# Patient Record
Sex: FEMALE | Race: WHITE | NOT HISPANIC OR LATINO | Employment: FULL TIME | URBAN - METROPOLITAN AREA
[De-identification: names, ages, dates, MRNs, and addresses within clinical notes are randomized per-mention and may not be internally consistent; named-entity substitution may affect disease eponyms.]

---

## 2018-04-09 ENCOUNTER — HISTORICAL (OUTPATIENT)
Dept: ADMINISTRATIVE | Facility: HOSPITAL | Age: 28
End: 2018-04-09

## 2018-04-09 LAB — PRODUCT READY: NORMAL

## 2018-05-27 ENCOUNTER — HOSPITAL ENCOUNTER (OUTPATIENT)
Dept: OBSTETRICS AND GYNECOLOGY | Facility: HOSPITAL | Age: 28
End: 2018-05-28
Attending: OBSTETRICS & GYNECOLOGY | Admitting: OBSTETRICS & GYNECOLOGY

## 2018-05-30 LAB — FINAL CULTURE: NORMAL

## 2022-03-10 ENCOUNTER — HISTORICAL (OUTPATIENT)
Dept: ADMINISTRATIVE | Facility: HOSPITAL | Age: 32
End: 2022-03-10

## 2022-03-10 ENCOUNTER — HISTORICAL (OUTPATIENT)
Dept: PREADMISSION TESTING | Facility: HOSPITAL | Age: 32
End: 2022-03-10

## 2022-03-10 LAB
ABS NEUT (OLG): 4.9 (ref 2.1–9.2)
ALBUMIN SERPL-MCNC: 4.4 G/DL (ref 3.5–5)
ALBUMIN/GLOB SERPL: 1.1 {RATIO} (ref 1.1–2)
ALP SERPL-CCNC: 84 U/L (ref 40–150)
ALT SERPL-CCNC: 20 U/L (ref 0–55)
APTT PPP: 34.8 S (ref 23.2–33.7)
AST SERPL-CCNC: 17 U/L (ref 5–34)
BASOPHILS # BLD AUTO: 0 10*3/UL (ref 0–0.2)
BASOPHILS NFR BLD AUTO: 0 %
BILIRUB SERPL-MCNC: 0.4 MG/DL
BILIRUBIN DIRECT+TOT PNL SERPL-MCNC: 0.2 (ref 0–0.5)
BILIRUBIN DIRECT+TOT PNL SERPL-MCNC: 0.2 (ref 0–0.8)
BUN SERPL-MCNC: 10.5 MG/DL (ref 7–18.7)
CALCIUM SERPL-MCNC: 9.3 MG/DL (ref 8.7–10.5)
CHLORIDE SERPL-SCNC: 105 MMOL/L (ref 98–107)
CO2 SERPL-SCNC: 26 MMOL/L (ref 22–29)
CREAT SERPL-MCNC: 0.73 MG/DL (ref 0.55–1.02)
EOSINOPHIL # BLD AUTO: 0 10*3/UL (ref 0–0.9)
EOSINOPHIL NFR BLD AUTO: 0 %
ERYTHROCYTE [DISTWIDTH] IN BLOOD BY AUTOMATED COUNT: 12.8 % (ref 11.5–17)
GLOBULIN SER-MCNC: 4.1 G/DL (ref 2.4–3.5)
GLUCOSE SERPL-MCNC: 93 MG/DL (ref 74–100)
HCT VFR BLD AUTO: 41.9 % (ref 37–47)
HEMOLYSIS INTERF INDEX SERPL-ACNC: 1
HGB BLD-MCNC: 13.6 G/DL (ref 12–16)
ICTERIC INTERF INDEX SERPL-ACNC: 1
INR PPP: 1.1 (ref 0–1.3)
LIPEMIC INTERF INDEX SERPL-ACNC: 1
LYMPHOCYTES # BLD AUTO: 0.8 10*3/UL (ref 0.6–4.6)
LYMPHOCYTES NFR BLD AUTO: 13 %
MANUAL DIFF? (OHS): NO
MCH RBC QN AUTO: 26.9 PG (ref 27–31)
MCHC RBC AUTO-ENTMCNC: 32.5 G/DL (ref 33–36)
MCV RBC AUTO: 83 FL (ref 80–94)
MONOCYTES # BLD AUTO: 0.1 10*3/UL (ref 0.1–1.3)
MONOCYTES NFR BLD AUTO: 2 %
NEUTROPHILS # BLD AUTO: 4.9 10*3/UL (ref 2.1–9.2)
NEUTROPHILS NFR BLD AUTO: 84 %
PLATELET # BLD AUTO: 210 10*3/UL (ref 130–400)
PMV BLD AUTO: 11.6 FL (ref 9.4–12.4)
POTASSIUM SERPL-SCNC: 4.5 MMOL/L (ref 3.5–5.1)
PROT SERPL-MCNC: 8.5 G/DL (ref 6.4–8.3)
PROTHROMBIN TIME: 13.5 S (ref 12.5–14.5)
RBC # BLD AUTO: 5.05 10*6/UL (ref 4.2–5.4)
SODIUM SERPL-SCNC: 139 MMOL/L (ref 136–145)
WBC # SPEC AUTO: 5.8 10*3/UL (ref 4.5–11.5)

## 2022-03-17 ENCOUNTER — HISTORICAL (OUTPATIENT)
Dept: ADMINISTRATIVE | Facility: HOSPITAL | Age: 32
End: 2022-03-17

## 2022-05-14 NOTE — OP NOTE
DATE OF SURGERY:    03/17/2022    SURGEON:  Roc Salcedo Jr., MD    PREOPERATIVE DIAGNOSIS:  Left atelectatic tympanic membrane with conductive hearing loss.    POSTOPERATIVE DIAGNOSIS:  Left atelectatic tympanic membrane with conductive hearing loss.    INDICATION:  Evaluation and treatment.    COMPLICATIONS:  None.    BLOOD LOSS:  10 cc.    FINDINGS:  Left atelectatic tympanic membrane with myringostapediopexy and erosion of the IS joint.    DETAILS OF PROCEDURE:  Patient was brought to the operating room, identified by name and patient number.  General endotracheal anesthesia was successfully induced by the anesthesia service.  The patient was then prepped and draped in the standard fashion for left ear surgery.  Facial nerve monitor was placed by an in-room technician and confirmed to be in good working condition throughout the duration of the case.  I first began with a transcanal view via the otomicroscope.  This revealed a left atelectatic tympanic membrane with myringostapediopexy.  Tympanomeatal flap was elevated.  The middle ear was entered inferiorly and then carefully elevated superiorly.  The tympanic membrane was closely adherent to the stapes and down onto the superstructure.  This was carefully dissected free.  There were several small bits of skin on the stapes capitulum, but with persistent effort, these appeared to be removed without issue.  This revealed a soft tissue attachment of the incus and stapes without good continuity.  The IS joint was then severed and the incus was subsequently removed.  The chorda tympani was preserved.  At this point, attention was then turned towards reconstruction.  Via a separate incision, the tragal cartilage was harvested and then carved on the back table into a total cartilage island flap with a sentinel hole for potential PE tube in the future.  This was placed in an underlay fashion on a bed of Gelfoam.  A Dornhoffer 2 mm fixed PORP was then placed  on the stapes capitulum and under the manubrium of the malleus.  The stapes was turned a bit inferiorly towards the promontory and that was supported with several bits of Gelfoam allowing for a truer anatomical position.  The cartilage was set back down as was the tympanomeatal flap.      Gelfoam was placed on the drum, followed by ointments, and a Andrey dressing.  No complications.  Minimal blood loss.  Facial nerve monitor was quiet throughout this hour and a half long procedure.        ______________________________  MD AL Edmond Jr./GALI  DD:  03/17/2022  Time:  12:14PM  DT:  03/17/2022  Time:  12:28PM  Job #:  [id number]